# Patient Record
Sex: MALE | Race: WHITE | NOT HISPANIC OR LATINO
[De-identification: names, ages, dates, MRNs, and addresses within clinical notes are randomized per-mention and may not be internally consistent; named-entity substitution may affect disease eponyms.]

---

## 2020-06-22 ENCOUNTER — DASHBOARD ENCOUNTERS (OUTPATIENT)
Age: 70
End: 2020-06-22

## 2020-06-22 ENCOUNTER — OFFICE VISIT (OUTPATIENT)
Dept: URBAN - METROPOLITAN AREA CLINIC 98 | Facility: CLINIC | Age: 70
End: 2020-06-22

## 2020-06-22 RX ORDER — ECHINACEA 380 MG
CAPSULE ORAL
Qty: 0 | Refills: 0 | Status: ACTIVE | COMMUNITY
Start: 1900-01-01 | End: 1900-01-01

## 2020-06-22 RX ORDER — CANAGLIFLOZIN 100 MG/1
TAKE 2  TABLET (200 MG) BY ORAL ROUTE ONCE DAILY BEFORE THE FIRST MEAL OF THE DAY TABLET, FILM COATED ORAL 1
Qty: 60 | Refills: 0 | Status: ACTIVE | COMMUNITY
Start: 1900-01-01 | End: 1900-01-01

## 2020-06-22 RX ORDER — GLIPIZIDE 10 MG/1
TAKE 2 TABLETS (20 MG) BY ORAL ROUTE ONCE DAILY WITH BREAKFAST TABLET, FILM COATED, EXTENDED RELEASE ORAL 1
Qty: 0 | Refills: 0 | Status: ACTIVE | COMMUNITY
Start: 1900-01-01 | End: 1900-01-01

## 2020-06-22 RX ORDER — ASPIRIN 81 MG/1
CHEW 1 TABLET (81 MG) BY ORAL ROUTE ONCE DAILY TABLET, CHEWABLE ORAL 1
Qty: 0 | Refills: 0 | Status: ACTIVE | COMMUNITY
Start: 1900-01-01 | End: 1900-01-01

## 2020-06-22 RX ORDER — MESALAMINE 500 MG/1
TAKE 4 CAPSULES TWICE A DAY CAPSULE ORAL
Qty: 720 | Refills: 2 | Status: ACTIVE | COMMUNITY
Start: 2020-02-21 | End: 1900-01-01

## 2020-06-22 RX ORDER — ADALIMUMAB 40MG/0.4ML
INJECT  0.4MILLILITER BY SUBCUTANEOUS ROUTE WEEKLY KIT SUBCUTANEOUS
Qty: 7 | Refills: 3 | OUTPATIENT
Start: 2020-06-22 | End: 2021-06-17

## 2020-06-22 RX ORDER — GEMFIBROZIL 600 MG/1
TAKE 1 TABLET (600 MG) BY ORAL ROUTE 2 TIMES PER DAY 30 MINUTES BEFORE MORNING AND EVENING MEAL TABLET, FILM COATED ORAL 2
Qty: 0 | Refills: 0 | Status: ACTIVE | COMMUNITY
Start: 1900-01-01 | End: 1900-01-01

## 2020-06-22 RX ORDER — ADALIMUMAB 80MG/0.8ML
AS DIRECTED KIT SUBCUTANEOUS
OUTPATIENT
Start: 2020-06-22

## 2020-06-22 RX ORDER — LOSARTAN POTASSIUM 50 MG/1
TAKE 1 TABLET (50 MG) BY ORAL ROUTE ONCE DAILY TABLET ORAL 1
Qty: 0 | Refills: 0 | Status: ACTIVE | COMMUNITY
Start: 1900-01-01 | End: 1900-01-01

## 2020-06-22 RX ORDER — HYDROCHLOROTHIAZIDE 25 MG/1
TAKE ONE-HALF TABLET (12.5 MG) BY ORAL ROUTE 2 TIMES PER DAY TABLET ORAL 2
Qty: 0 | Refills: 0 | Status: ACTIVE | COMMUNITY
Start: 1900-01-01 | End: 1900-01-01

## 2020-06-22 RX ORDER — METOPROLOL TARTRATE 100 MG/1
TAKE 1 TABLET (100 MG) BY ORAL ROUTE 2 TIMES PER DAY TABLET, FILM COATED ORAL 2
Qty: 0 | Refills: 0 | Status: ACTIVE | COMMUNITY
Start: 1900-01-01 | End: 1900-01-01

## 2020-06-22 NOTE — HPI-TODAY'S VISIT:
71 yo male with Crohn's disease comes in for interval follow up.  Typically have 1 stool a day No pain. Goes to br multiple No bleeding  Current prednisone 30 mg a day and taper.  Never had biologic.  Collapsed on the floor as he took prednisone and insulin at the same time.  Insuline dependent diabetic  No EIM - n for age.

## 2020-06-25 LAB
A/G RATIO: 2.1
ALBUMIN: 4.1
ALKALINE PHOSPHATASE: 61
ALT (SGPT): 16
AST (SGOT): 13
BASO (ABSOLUTE): 0
BASOS: 0
BILIRUBIN, TOTAL: 0.4
BUN/CREATININE RATIO: 26
BUN: 20
C-REACTIVE PROTEIN, QUANT: <1
CALCIUM: 10.1
CARBON DIOXIDE, TOTAL: 25
CHLORIDE: 98
CREATININE: 0.78
EGFR IF AFRICN AM: 106
EGFR IF NONAFRICN AM: 91
EOS (ABSOLUTE): 0
EOS: 0
FERRITIN, SERUM: 99
FOLATE (FOLIC ACID), SERUM: >20
GLOBULIN, TOTAL: 2
GLUCOSE: 144
HBSAG SCREEN: NEGATIVE
HEMATOCRIT: 40.9
HEMATOLOGY COMMENTS:: (no result)
HEMOGLOBIN: 13.5
HEP B CORE AB, IGM: NEGATIVE
HEP B CORE AB, TOT: POSITIVE
HEP B SURFACE AB, QUAL: REACTIVE
HEP BE AB: NEGATIVE
HEP BE AG: NEGATIVE
IMMATURE CELLS: (no result)
IMMATURE GRANS (ABS): 0.1
IMMATURE GRANULOCYTES: 2
IRON BIND.CAP.(TIBC): 423
IRON SATURATION: 18
IRON: 75
LYMPHS (ABSOLUTE): 0.6
LYMPHS: 9
MCH: 28.4
MCHC: 33
MCV: 86
MONOCYTES(ABSOLUTE): 0.3
MONOCYTES: 5
NEUTROPHILS (ABSOLUTE): 5.4
NEUTROPHILS: 84
NRBC: (no result)
PLATELETS: 188
POTASSIUM: 5.3
PROTEIN, TOTAL: 6.1
QUANTIFERON CRITERIA: (no result)
QUANTIFERON INCUBATION: (no result)
QUANTIFERON MITOGEN VALUE: 0.28
QUANTIFERON NIL VALUE: 0.02
QUANTIFERON TB1 AG VALUE: 0.03
QUANTIFERON TB2 AG VALUE: 0.02
QUANTIFERON-TB GOLD PLUS: (no result)
RBC: 4.75
RDW: 14
SEDIMENTATION RATE-WESTERGREN: 4
SODIUM: 140
UIBC: 348
VITAMIN B12: 1814
WBC: 6.4

## 2020-06-26 ENCOUNTER — TELEPHONE ENCOUNTER (OUTPATIENT)
Dept: URBAN - METROPOLITAN AREA CLINIC 92 | Facility: CLINIC | Age: 70
End: 2020-06-26

## 2020-06-27 ENCOUNTER — TELEPHONE ENCOUNTER (OUTPATIENT)
Dept: URBAN - METROPOLITAN AREA CLINIC 92 | Facility: CLINIC | Age: 70
End: 2020-06-27

## 2020-07-06 LAB
QUANTIFERON CRITERIA: (no result)
QUANTIFERON INCUBATION: (no result)
QUANTIFERON MITOGEN VALUE: 0.42
QUANTIFERON NIL VALUE: 0.02
QUANTIFERON TB1 AG VALUE: 0.02
QUANTIFERON TB2 AG VALUE: 0.02
QUANTIFERON-TB GOLD PLUS: (no result)

## 2020-07-07 ENCOUNTER — OFFICE VISIT (OUTPATIENT)
Dept: URBAN - METROPOLITAN AREA SURGERY CENTER 18 | Facility: SURGERY CENTER | Age: 70
End: 2020-07-07

## 2020-07-22 ENCOUNTER — TELEPHONE ENCOUNTER (OUTPATIENT)
Dept: URBAN - METROPOLITAN AREA CLINIC 92 | Facility: CLINIC | Age: 70
End: 2020-07-22

## 2020-08-25 ENCOUNTER — OFFICE VISIT (OUTPATIENT)
Dept: URBAN - METROPOLITAN AREA SURGERY CENTER 18 | Facility: SURGERY CENTER | Age: 70
End: 2020-08-25

## 2020-10-13 ENCOUNTER — OFFICE VISIT (OUTPATIENT)
Dept: URBAN - METROPOLITAN AREA SURGERY CENTER 18 | Facility: SURGERY CENTER | Age: 70
End: 2020-10-13